# Patient Record
Sex: FEMALE | Race: OTHER | Employment: FULL TIME | URBAN - METROPOLITAN AREA
[De-identification: names, ages, dates, MRNs, and addresses within clinical notes are randomized per-mention and may not be internally consistent; named-entity substitution may affect disease eponyms.]

---

## 2022-07-27 ENCOUNTER — APPOINTMENT (OUTPATIENT)
Dept: CT IMAGING | Age: 27
End: 2022-07-27
Payer: COMMERCIAL

## 2022-07-27 ENCOUNTER — HOSPITAL ENCOUNTER (EMERGENCY)
Age: 27
Discharge: HOME | End: 2022-07-27
Payer: COMMERCIAL

## 2022-07-27 VITALS
TEMPERATURE: 98.2 F | RESPIRATION RATE: 20 BRPM | HEART RATE: 74 BPM | SYSTOLIC BLOOD PRESSURE: 143 MMHG | OXYGEN SATURATION: 100 % | DIASTOLIC BLOOD PRESSURE: 81 MMHG

## 2022-07-27 DIAGNOSIS — S09.90XA CLOSED HEAD INJURY, INITIAL ENCOUNTER: Primary | ICD-10-CM

## 2022-07-27 PROCEDURE — 70486 CT MAXILLOFACIAL W/O DYE: CPT

## 2022-07-27 PROCEDURE — 70450 CT HEAD/BRAIN W/O DYE: CPT

## 2022-07-27 PROCEDURE — 72125 CT NECK SPINE W/O DYE: CPT

## 2022-07-27 PROCEDURE — 99284 EMERGENCY DEPT VISIT MOD MDM: CPT

## 2022-07-27 PROCEDURE — 6370000000 HC RX 637 (ALT 250 FOR IP): Performed by: PHYSICIAN ASSISTANT

## 2022-07-27 RX ORDER — CYCLOBENZAPRINE HCL 10 MG
10 TABLET ORAL ONCE
Status: COMPLETED | OUTPATIENT
Start: 2022-07-27 | End: 2022-07-27

## 2022-07-27 RX ORDER — ACETAMINOPHEN 500 MG
1000 TABLET ORAL ONCE
Status: COMPLETED | OUTPATIENT
Start: 2022-07-27 | End: 2022-07-27

## 2022-07-27 RX ADMIN — CYCLOBENZAPRINE 10 MG: 10 TABLET, FILM COATED ORAL at 18:24

## 2022-07-27 RX ADMIN — ACETAMINOPHEN 1000 MG: 500 TABLET ORAL at 18:24

## 2022-07-27 ASSESSMENT — ENCOUNTER SYMPTOMS
NAUSEA: 0
VOMITING: 0
ABDOMINAL PAIN: 0
DIARRHEA: 0
SHORTNESS OF BREATH: 0

## 2022-07-27 ASSESSMENT — PAIN - FUNCTIONAL ASSESSMENT: PAIN_FUNCTIONAL_ASSESSMENT: 0-10

## 2022-07-27 ASSESSMENT — PAIN SCALES - GENERAL: PAINLEVEL_OUTOF10: 10

## 2022-07-27 NOTE — ED PROVIDER NOTES
[facility]        Pt Name: Pepe Simon  MRN: [id number]  [place] 1995  Date of evaluation: 7/27/2022  Provider: Whit Tompkins PA-C  PCP: No primary care provider on file. Note Started: 7:29 PM EDT       TAMMY. I have evaluated this patient. My supervising physician was available for consultation. CHIEF COMPLAINT       Chief Complaint   Patient presents with    Fall     Patient triaged using  #[id number], states was at work and a grid like object with wheels fell on her whole body, states head hurts the worst. Patient has knot noted to R side of forehead. HISTORY OF PRESENT ILLNESS   (Location, Timing/Onset, Context/Setting, Quality, Duration, Modifying Factors, Severity, Associated Signs and Symptoms)  Note limiting factors. The patient's preferred language is Korean, language lines are used for interpretation,  #[id number]    Chief Complaint: Closed head injury    Pepe Simon is a 32 y.o. female who presents to the emergency department today for evaluation for a closed head injury which occurred shortly before arriving to the ED. The patient states that she was at work, and she states that a wire net fell and hit her on the right side of her head. There was no loss of consciousness. No vomiting. She states that this did cause her to fall on her back. Patient is not on any blood thinners. When she arrived to the ED, she is complaining of pain to the right side of her head from where the area hit, she believes that it weighs less than 40 pounds. Patient is also reporting a headache. She is rating all of her discomfort as a 10/10, she otherwise denies any known alleviating or aggravating factors. Patient has no visual changes. She has no numbness, tingling or weakness. She is reporting mild pain to the right side of her neck but otherwise denies any back pain. She has no radiculopathy. She has no numbness, tingling or weakness. No chest pain. No shortness of breath. No abdominal pain. She is able to ambulate that difficulty. No other complaints or symptoms    Nursing Notes were all reviewed and agreed with or any disagreements were addressed in the HPI. REVIEW OF SYSTEMS    (2-9 systems for level 4, 10 or more for level 5)     Review of Systems   Constitutional:  Negative for activity change, appetite change, chills and fever. Eyes:  Negative for visual disturbance. Respiratory:  Negative for shortness of breath. Cardiovascular:  Negative for chest pain. Gastrointestinal:  Negative for abdominal pain, diarrhea, nausea and vomiting. Genitourinary:  Negative for difficulty urinating, dysuria and hematuria. Neurological:  Positive for headaches. Negative for weakness and numbness. Positives and Pertinent negatives as per HPI. Except as noted above in the ROS, all other systems were reviewed and negative. PAST MEDICAL HISTORY   History reviewed. No pertinent past medical history. SURGICAL HISTORY   History reviewed. No pertinent surgical history. CURRENTMEDICATIONS       Previous Medications    No medications on file         ALLERGIES     Patient has no known allergies. FAMILYHISTORY     History reviewed. No pertinent family history.        SOCIAL HISTORY       Social History     Tobacco Use    Smoking status: Never    Smokeless tobacco: Never   Substance Use Topics    Alcohol use: Never    Drug use: Never       SCREENINGS    John Coma Scale  Eye Opening: Spontaneous  Best Verbal Response: Oriented  Best Motor Response: Obeys commands  John Coma Scale Score: 15        PHYSICAL EXAM    (up to 7 for level 4, 8 or more for level 5)     ED Triage Vitals [07/27/22 1810]   BP Temp Temp Source Heart Rate Resp SpO2 Height Weight   (!) 143/81 98.2 °F (36.8 °C) Oral 74 20 100 % -- --       Physical Exam  Vitals and nursing note reviewed. Constitutional:       Appearance: She is well-developed. She is not diaphoretic. HENT:      Head: Normocephalic and atraumatic. Comments: There is tenderness, and mild edema noted over the right frontal forehead, with an abrasion noted. No bogginess. No bony step-offs. There is tenderness noted over this area. No other facial bone tenderness. No sanderson sign or raccoon sign. No CSF rhinorrhea. Right Ear: External ear normal.      Left Ear: External ear normal.      Nose: Nose normal.      Mouth/Throat:      Mouth: Mucous membranes are moist.      Pharynx: Oropharynx is clear. No posterior oropharyngeal erythema. Eyes:      General:         Right eye: No discharge. Left eye: No discharge. Extraocular Movements: Extraocular movements intact. Conjunctiva/sclera: Conjunctivae normal.      Pupils: Pupils are equal, round, and reactive to light. Neck:      Trachea: No tracheal deviation. Cardiovascular:      Rate and Rhythm: Normal rate and regular rhythm. Pulses: Normal pulses. Heart sounds: No murmur heard. Pulmonary:      Effort: Pulmonary effort is normal. No respiratory distress. Breath sounds: Normal breath sounds. No wheezing or rales. Comments: No ecchymosis or obvious signs of trauma  Chest:      Chest wall: No tenderness. Abdominal:      General: Bowel sounds are normal. There is no distension. Palpations: Abdomen is soft. Tenderness: There is no abdominal tenderness. There is no guarding. Comments: No ecchymosis or obvious signs of trauma   Musculoskeletal:         General: Normal range of motion. Cervical back: Normal range of motion and neck supple. Comments: There is tenderness palpation to the paraspinous muscles of the right cervical spine, there is no midline tenderness. No other tenderness noted to the back, ecchymosis or obvious signs of trauma. Skin:     General: Skin is warm and dry.    Neurological: Exception - unselect if not a suspected or confirmed emergency medical condition->Emergency Medical Condition (MA) Is the patient pregnant?->No Reason for Exam: Fall (Patient triaged using  #[id number], states was at work and a grid like object with wheels fell on her whole body, states head hurts the worst. Patient has knot noted to R side of forehead. ). FINDINGS: BRAIN/VENTRICLES: There is no acute intracranial hemorrhage, mass effect or midline shift. No abnormal extra-axial fluid collection. The gray-white differentiation is maintained without evidence of an acute infarct. There is no evidence of hydrocephalus. ORBITS: The visualized portion of the orbits demonstrate no acute abnormality. SINUSES: The visualized paranasal sinuses and mastoid air cells demonstrate no acute abnormality. SOFT TISSUES/SKULL:  Hematoma overlying right frontal bone. No acute intracranial abnormality. CT FACIAL BONES WO CONTRAST    Result Date: 7/27/2022  EXAMINATION: CT OF THE FACE WITHOUT CONTRAST  7/27/2022 6:37 pm TECHNIQUE: CT of the face was performed without the administration of intravenous contrast. Multiplanar reformatted images are provided for review. Automated exposure control, iterative reconstruction, and/or weight based adjustment of the mA/kV was utilized to reduce the radiation dose to as low as reasonably achievable. COMPARISON: None HISTORY: ORDERING SYSTEM PROVIDED HISTORY: injury TECHNOLOGIST PROVIDED HISTORY: Reason for exam:->injury Decision Support Exception - unselect if not a suspected or confirmed emergency medical condition->Emergency Medical Condition (MA) Is the patient pregnant?->No Reason for Exam: Fall (Patient triaged using  #[id number], states was at work and a grid like object with wheels fell on her whole body, states head hurts the worst. Patient has knot noted to R side of forehead. ).  FINDINGS: FACIAL BONES: The frontal sinuses, orbital walls, maxilla, pterygoid plates, 143/81   Pulse: 74   Resp: 20   Temp: 98.2 °F (36.8 °C)   TempSrc: Oral   SpO2: 100%       Patient was given the following medications:  Medications   acetaminophen (TYLENOL) tablet 1,000 mg (1,000 mg Oral Given 7/27/22 1824)   cyclobenzaprine (FLEXERIL) tablet 10 mg (10 mg Oral Given 7/27/22 1824)         Is this patient to be included in the SEP-1 Core Measure due to severe sepsis or septic shock? No   Exclusion criteria - the patient is NOT to be included for SEP-1 Core Measure due to: Infection is not suspected    Briefly, this is a 75-year-old female who presents to the emergency department today for evaluation for a closed head injury which occurred today before arrival to the ED. The patient states that she was at work, and she states that a wire netting fell, and hit her in the head. No loss of consciousness. There is no vomiting. On examination she does have tenderness, and edema as well as abrasion noted to the right forehead, there are no neurological deficits. She does have tenderness noted to the paraspinous muscles of the right cervical spine. CT of head, cervical spine and facial bones are negative. Symptoms today are likely due to a closed head injury, and contusion. Supportive care discussed at home. She is to obtain follow-up with her primary care physician within 2 to 3 days for reevaluation. She is to return to the ED for any new or worsening symptoms. Patient voiced understanding and is agreeable with plan. She is stable for discharge. No results found for this visit on 07/27/22. I estimate there is LOW risk for COMPARTMENT SYNDROME, DEEP VENOUS THROMBOSIS, SEPTIC ARTHRITIS, TENDON OR NEUROVASCULAR INJURY, thus I consider the discharge disposition reasonable.  Katerina Dodge and I have discussed the diagnosis and risks, and we agree with discharging home to follow-up with their primary doctor or the referral orthopedist. We also discussed returning to the Emergency Department immediately if new or worsening symptoms occur. We have discussed the symptoms which are most concerning (e.g., changing or worsening pain, numbness, weakness) that necessitate immediate return. FINAL IMPRESSION      1.  Closed head injury, initial encounter          DISPOSITION/PLAN   DISPOSITION Discharge - Pending Orders Complete 07/27/2022 07:33:57 PM      PATIENT REFERRED TO:  [facility]) Pre-Services  [phone]  Schedule an appointment as soon as possible for a visit in 2 days      [facility] Emergency Department  [address] [place] [address]  [phone]    As needed, If symptoms worsen    DISCHARGE MEDICATIONS:  New Prescriptions    No medications on file       DISCONTINUED MEDICATIONS:  Discontinued Medications    No medications on file              (Please note that portions of this note were completed with a voice recognition program.  Efforts were made to edit the dictations but occasionally words are mis-transcribed.)    Ezekiel Hernández PA-C (electronically signed)           Ezekiel Hernández PA-C  07/27/22 1934

## 2022-08-01 ENCOUNTER — APPOINTMENT (OUTPATIENT)
Dept: CT IMAGING | Age: 27
End: 2022-08-01
Payer: COMMERCIAL

## 2022-08-01 ENCOUNTER — NURSE TRIAGE (OUTPATIENT)
Dept: OTHER | Facility: CLINIC | Age: 27
End: 2022-08-01

## 2022-08-01 ENCOUNTER — HOSPITAL ENCOUNTER (EMERGENCY)
Age: 27
Discharge: HOME | End: 2022-08-01
Attending: EMERGENCY MEDICINE
Payer: COMMERCIAL

## 2022-08-01 VITALS
DIASTOLIC BLOOD PRESSURE: 77 MMHG | TEMPERATURE: 98.1 F | SYSTOLIC BLOOD PRESSURE: 120 MMHG | RESPIRATION RATE: 16 BRPM | HEIGHT: 62 IN | OXYGEN SATURATION: 100 % | WEIGHT: 121 LBS | BODY MASS INDEX: 22.26 KG/M2 | HEART RATE: 79 BPM
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
## 2022-08-01 DIAGNOSIS — S09.90XA CLOSED HEAD INJURY, INITIAL ENCOUNTER: ICD-10-CM

## 2022-08-01 PROCEDURE — 99284 EMERGENCY DEPT VISIT MOD MDM: CPT

## 2022-08-01 PROCEDURE — 70450 CT HEAD/BRAIN W/O DYE: CPT

## 2022-08-01 PROCEDURE — 72125 CT NECK SPINE W/O DYE: CPT

## 2022-08-01 ASSESSMENT — ENCOUNTER SYMPTOMS
DIARRHEA: 0
VOMITING: 0
SHORTNESS OF BREATH: 0
NAUSEA: 0
CHEST TIGHTNESS: 0
ABDOMINAL PAIN: 0
EYE REDNESS: 1

## 2022-08-01 ASSESSMENT — PAIN - FUNCTIONAL ASSESSMENT
PAIN_FUNCTIONAL_ASSESSMENT: 0-10
PAIN_FUNCTIONAL_ASSESSMENT: 0-10

## 2022-08-01 ASSESSMENT — PAIN SCALES - GENERAL: PAINLEVEL_OUTOF10: 8

## 2022-08-01 ASSESSMENT — VISUAL ACUITY: OU: 1

## 2022-08-01 NOTE — TELEPHONE ENCOUNTER
Received call from [facility] at [facility]; Patient with Red Flag Complaint requesting to establish care with unknown. Subjective: Caller states \"hit head at work on Thursday, has black eye, head pain, eye pain, forehead swelling and bruising\"     Current Symptoms: hit head at work on Thursday, has black eye, head pain, eye pain, forehead swelling and bruising    Onset: 4 days ago; unchanged    Associated Symptoms:  none    Pain Severity: 8/10; aching; waxing and waning    Temperature: denies    What has been tried: tylenol helps    LMP: NA Pregnant: No    Recommended disposition: Go to ED Now for further evaluation of head injury. Care advice provided, patient verbalizes understanding; denies any other questions or concerns; instructed to call back for any new or worsening symptoms. Patient/caller agrees to proceed to nearest Emergency Department    Attention Provider: Thank you for allowing me to participate in the care of your patient. The patient was connected to triage in response to information provided to the ECC. Please do not respond through this encounter as the response is not directed to a shared pool.         Reason for Disposition   Can't remember what happened (amnesia)    Protocols used: Head Injury-ADULT-[place]

## 2022-08-02 NOTE — ED PROVIDER NOTES
[facility]    Physician Attestation    Pt Name: Edie Prajapati  MRN: [id number]  [place] 1995  Date of evaluation: 8/1/22        Physician Note:    I havepersonally performed and/or participated in the history, exam and medical decision making and agree with all pertinent clinical information. I have also reviewed and agree with the past medical, family and social historyunless otherwise noted. I have personally performed a face to face diagnostic evaluation onthis patient. I have reviewed the mid-levels findings and agree. History: States she was hit the head by a metal mat at work on Thursday was seen here with a negative scan of the head face and neck still has headache in the right frontal region      REVIEW OF SYSTEMS    Constitutional:  Denies fever, chills, or weakness   Eyes:  Denies vision changes  HENT:  Denies sore throat or neck pain   Respiratory:  Denies cough or shortness of breath   Cardiovascular:  Denies chest pain  GI:  Denies abdominal pain, nausea, vomiting, or diarrhea   Musculoskeletal:  Denies back pain   Skin: no rash or vesicles   Neurologic:   headache   no weakness focal    Lymphatic:  no swollen  nodes   Psychiatric: no si or hs thoughts     All systems negative except as marked. General Appearance:  Alert, cooperative, no distress, appears stated age. Head:  Normocephalic, without obvious abnormality, Luberta Glance of the right frontal region tender   Eyes:  conjunctiva/corneas clear, EOM's intact. Sclera anicteric. ENT: Mucous membranes moist.   Neck: Supple, symmetrical, trachea midline, no adenopathy. No jugular venous distention. Lungs:   No Respiratory Distress. no rales  rhonchi rub   Chest Wall:  Nontender  no deformity   Heart:  Rsr no murmer gallop    Abdomen:   Soft nontender no organomegally    Extremities:  Full range of motion. no deformity   Pulses: Equal  upper and lower    Skin:  No rashes or lesions to exposed skin. Neurologic: Alert and oriented X 3. Motor grossly normal.  Speech clear. Cr n 2-12 intact     CT of the head and neck unremarkable patient will follow up with [facility]    1. Work related injury    2. Closed head injury, initial encounter          DISPOSITION/PLAN  PATIENT REFERRED TO:  [place] [address] [address] [facility]  [phone]    Schedule an appointment as soon as possible for a visit     DISCHARGE MEDICATIONS:  There are no discharge medications for this patient. Is this patient to be included in the SEP-1 Core Measure due to severe sepsis or septic shock? No   Exclusion criteria - the patient is NOT to be included for SEP-1 Core Measure due to:   Infection is not suspected      MD Dharmesh Comer MD  08/01/22 9938

## 2022-08-02 NOTE — ED PROVIDER NOTES
[address] [facility]        Pt Name: Edie Prajapati  MRN: [id number]  [place] 1995  Date of evaluation: 8/1/2022  Provider: CARL Waldron CNP  PCP: No primary care provider on file. Note Started: 9:42 PM EDT        I have seen and evaluated this patient with my supervising physician [address] [place] [address]       Chief Complaint   Patient presents with    Head Injury     Pt states that she was working on Thursday and had an accident at work. Pt states that she hit her head on a big piece of metal on the cart. pt was going to follow-up apt with a doctor but she started having neck pain, really bad head pain and the bright lights started hurting her eyes and loud noises cause a headache since saturday so they told her to come here. Has been taking tylenol but hasn't helped. 8/10 pain        HISTORY OF PRESENT ILLNESS   (Location, Timing/Onset, Context/Setting, Quality, Duration, Modifying Factors, Severity, Associated Signs and Symptoms)  Note limiting factors. Chief Complaint: re-evaluation for injury     Edie Prajapati is a 32 y.o. female who presents to the emergency department for reevaluation of injury. The patient reports that on 7/27/2022 that while working a gridlike object with wheels fell on her whole body, injuring her head/face and neck. Reports that she was seen that day and had a CT scan which was benign. Returns today requesting reevaluation, states that she continues to have a headache and redness to the right eye. No visual disturbances. Also complaining of right-sided neck pain. The patient has no focal weakness, numbness or tingling. Denies any fever, lightheadedness, dizziness, visual disturbances. No chest pain or pressure. No back pain. No shortness of breath, cough, or congestion. No abdominal pain, nausea, vomiting, diarrhea, constipation, or dysuria.   No rash.    Nursing Notes were all reviewed and agreed with or any disagreements were addressed in the HPI. REVIEW OF SYSTEMS    (2-9 systems for level 4, 10 or more for level 5)     Review of Systems   Constitutional:  Negative for activity change, chills and fever. Eyes:  Positive for redness. Respiratory:  Negative for chest tightness and shortness of breath. Cardiovascular:  Negative for chest pain. Gastrointestinal:  Negative for abdominal pain, diarrhea, nausea and vomiting. Genitourinary:  Negative for dysuria. Musculoskeletal:  Positive for neck pain. Neurological:  Positive for headaches. All other systems reviewed and are negative. Positives and Pertinent negatives as per HPI. Except as noted above in the ROS, all other systems were reviewed and negative. PAST MEDICAL HISTORY   History reviewed. No pertinent past medical history. SURGICAL HISTORY   History reviewed. No pertinent surgical history. CURRENTMEDICATIONS       There are no discharge medications for this patient. ALLERGIES     Patient has no known allergies. FAMILYHISTORY     History reviewed. No pertinent family history. SOCIAL HISTORY       Social History     Tobacco Use    Smoking status: Never    Smokeless tobacco: Never   Substance Use Topics    Alcohol use: Never    Drug use: Never       SCREENINGS    John Coma Scale  Eye Opening: Spontaneous  Best Verbal Response: Oriented  Best Motor Response: Obeys commands  John Coma Scale Score: 15        PHYSICAL EXAM    (up to 7 for level 4, 8 or more for level 5)     ED Triage Vitals [08/01/22 1957]   BP Temp Temp Source Heart Rate Resp SpO2 Height Weight   120/77 98.1 °F (36.7 °C) Oral 79 16 100 % 5' 2\" (1.575 m) 121 lb (54.9 kg)       Physical Exam  Vitals and nursing note reviewed. Constitutional:       Appearance: She is well-developed. She is not diaphoretic. HENT:      Head: Normocephalic.         Right Ear: External ear normal. Left Ear: External ear normal.   Eyes:      General: Vision grossly intact. Right eye: No discharge. Left eye: No discharge. Extraocular Movements: Extraocular movements intact. Conjunctiva/sclera:      Right eye: Hemorrhage present. Neck:      Vascular: No JVD. Cardiovascular:      Rate and Rhythm: Normal rate and regular rhythm. Pulses: Normal pulses. Heart sounds: Normal heart sounds. Pulmonary:      Effort: Pulmonary effort is normal. No respiratory distress. Breath sounds: Normal breath sounds. Abdominal:      Palpations: Abdomen is soft. Musculoskeletal:         General: Normal range of motion. Skin:     General: Skin is warm and dry. Coloration: Skin is not pale. Neurological:      Mental Status: She is alert. Cranial Nerves: Cranial nerves are intact. Sensory: Sensation is intact. Motor: Motor function is intact. Coordination: Coordination is intact. Psychiatric:         Behavior: Behavior normal.       DIAGNOSTIC RESULTS   LABS:    Labs Reviewed - No data to display    When ordered only abnormal lab results are displayed. All other labs were within normal range or not returned as of this dictation. EKG: When ordered, EKG's are interpreted by the Emergency Department Physician in the absence of a cardiologist.  Please see their note for interpretation of EKG. RADIOLOGY:   Non-plain film images such as CT, Ultrasound and MRI are read by the radiologist. Plain radiographic images are visualized and preliminarily interpreted by the ED Provider with the below findings:        Interpretation per the Radiologist below, if available at the time of this note:    CT CERVICAL SPINE WO CONTRAST   Final Result   No acute abnormality of the cervical spine.          CT HEAD WO CONTRAST   Preliminary Result   Redemonstration of soft tissue hematoma overlying the right frontal bone,   which appears to have slightly decreased when compared to prior study. No   convincing evidence of intracranial hemorrhage, mass effect or acute   territorial infarct seen. RECOMMENDATIONS:   If there is persistent clinical concern, short-term follow-up examination or   MRI can be consider. CT HEAD WO CONTRAST    Result Date: 8/1/2022  EXAMINATION: CT OF THE HEAD WITHOUT CONTRAST  8/1/2022 8:31 pm TECHNIQUE: CT of the head was performed without the administration of intravenous contrast. Automated exposure control, iterative reconstruction, and/or weight based adjustment of the mA/kV was utilized to reduce the radiation dose to as low as reasonably achievable. COMPARISON: 07/27/2022. HISTORY: ORDERING SYSTEM PROVIDED HISTORY: head injury TECHNOLOGIST PROVIDED HISTORY: If patient is on cardiac monitor and/or pulse ox, they may be taken off cardiac monitor and pulse ox, left on O2 if currently on. All monitors reattached when patient returns to room. Has a \"code stroke\" or \"stroke alert\" been called? ->No Reason for exam:->head injury Decision Support Exception - unselect if not a suspected or confirmed emergency medical condition->Emergency Medical Condition (MA) Is the patient pregnant?->No Reason for Exam: Head Injury (Pt states that she was working on Thursday and had an accident at work. Pt states that she hit her head on a big piece of metal on the cart. pt was going to follow-up apt with a doctor but she started having neck pain, really bad head pain and the bright lights started hurting her eyes and loud noises cause a headache since saturday so they told her to come here. Has been taking tylenol but hasn't helped. 8/10 pain ). FINDINGS: BRAIN/VENTRICLES: No convincing evidence of acute intracranial hemorrhage, mass effect or midline shift seen. No abnormal extra-axial fluid collection seen. No evidence of acute territorial infarct is seen. ORBITS: The visualized portion of the orbits demonstrate no acute abnormality.  SINUSES: The visualized paranasal sinuses and mastoid air cells demonstrate no acute abnormality. SOFT TISSUES/SKULL:  There is redemonstration of soft tissue hematoma overlying the right frontal bone, which appears to have slightly decreased when compared to prior study. Redemonstration of soft tissue hematoma overlying the right frontal bone, which appears to have slightly decreased when compared to prior study. No convincing evidence of intracranial hemorrhage, mass effect or acute territorial infarct seen. RECOMMENDATIONS: If there is persistent clinical concern, short-term follow-up examination or MRI can be consider. CT HEAD WO CONTRAST    Result Date: 7/27/2022  EXAMINATION: CT OF THE HEAD WITHOUT CONTRAST  7/27/2022 6:37 pm TECHNIQUE: CT of the head was performed without the administration of intravenous contrast. Automated exposure control, iterative reconstruction, and/or weight based adjustment of the mA/kV was utilized to reduce the radiation dose to as low as reasonably achievable. COMPARISON: None. HISTORY: ORDERING SYSTEM PROVIDED HISTORY: fall TECHNOLOGIST PROVIDED HISTORY: Reason for exam:->fall Has a \"code stroke\" or \"stroke alert\" been called? ->No Decision Support Exception - unselect if not a suspected or confirmed emergency medical condition->Emergency Medical Condition (MA) Is the patient pregnant?->No Reason for Exam: Fall (Patient triaged using  #[id number], states was at work and a grid like object with wheels fell on her whole body, states head hurts the worst. Patient has knot noted to R side of forehead. ). FINDINGS: BRAIN/VENTRICLES: There is no acute intracranial hemorrhage, mass effect or midline shift. No abnormal extra-axial fluid collection. The gray-white differentiation is maintained without evidence of an acute infarct. There is no evidence of hydrocephalus. ORBITS: The visualized portion of the orbits demonstrate no acute abnormality.  SINUSES: The visualized paranasal sinuses and mastoid air cells demonstrate no acute abnormality. SOFT TISSUES/SKULL:  Hematoma overlying right frontal bone. No acute intracranial abnormality. CT FACIAL BONES WO CONTRAST    Result Date: 7/27/2022  EXAMINATION: CT OF THE FACE WITHOUT CONTRAST  7/27/2022 6:37 pm TECHNIQUE: CT of the face was performed without the administration of intravenous contrast. Multiplanar reformatted images are provided for review. Automated exposure control, iterative reconstruction, and/or weight based adjustment of the mA/kV was utilized to reduce the radiation dose to as low as reasonably achievable. COMPARISON: None HISTORY: ORDERING SYSTEM PROVIDED HISTORY: injury TECHNOLOGIST PROVIDED HISTORY: Reason for exam:->injury Decision Support Exception - unselect if not a suspected or confirmed emergency medical condition->Emergency Medical Condition (MA) Is the patient pregnant?->No Reason for Exam: Fall (Patient triaged using  #[id number], states was at work and a grid like object with wheels fell on her whole body, states head hurts the worst. Patient has knot noted to R side of forehead. ). FINDINGS: FACIAL BONES: The frontal sinuses, orbital walls, maxilla, pterygoid plates, zygomatic arches, hard palate, nasal bones and mandible are intact. The temporomandibular joints are aligned. ORBITAL CONTENTS: The globes appear intact. The extraocular muscles, optic nerve sheath complexes and lacrimal glands appear unremarkable. No retrobulbar hematoma or mass is seen. SINUSES: There is no evidence of acute sinusitis, such as air fluid level. The mastoid air cells are clear. SOFT TISSUES: No superficial facial soft tissue swelling is seen. No acute facial bone trauma.      CT CERVICAL SPINE WO CONTRAST    Result Date: 7/27/2022  EXAMINATION: CT OF THE CERVICAL SPINE WITHOUT CONTRAST 7/27/2022 6:37 pm TECHNIQUE: CT of the cervical spine was performed without the administration of intravenous contrast. Multiplanar reformatted images are provided for review. Automated exposure control, iterative reconstruction, and/or weight based adjustment of the mA/kV was utilized to reduce the radiation dose to as low as reasonably achievable. COMPARISON: None. HISTORY: ORDERING SYSTEM PROVIDED HISTORY: fall TECHNOLOGIST PROVIDED HISTORY: Reason for exam:->fall Decision Support Exception - unselect if not a suspected or confirmed emergency medical condition->Emergency Medical Condition (MA) Is the patient pregnant?->No Reason for Exam: Fall (Patient triaged using  #[id number], states was at work and a grid like object with wheels fell on her whole body, states head hurts the worst. Patient has knot noted to R side of forehead. ). FINDINGS: BONES/ALIGNMENT: There is no acute fracture or traumatic malalignment. DEGENERATIVE CHANGES: No significant degenerative changes. SOFT TISSUES: There is no prevertebral soft tissue swelling. No acute abnormality of the cervical spine. PROCEDURES   Unless otherwise noted below, none     Procedures    CRITICAL CARE TIME       CONSULTS:  None      EMERGENCY DEPARTMENT COURSE and DIFFERENTIAL DIAGNOSIS/MDM:   Vitals:    Vitals:    08/01/22 1957   BP: 120/77   Pulse: 79   Resp: 16   Temp: 98.1 °F (36.7 °C)   TempSrc: Oral   SpO2: 100%   Weight: 121 lb (54.9 kg)   Height: 5' 2\" (1.575 m)       Patient was given the following medications:  Medications - No data to display      Is this patient to be included in the SEP-1 Core Measure due to severe sepsis or septic shock? No   Exclusion criteria - the patient is NOT to be included for SEP-1 Core Measure due to: Infection is not suspected    Briefly, this is an otherwise healthy 66-year-old female who presents to the emergency department for evaluation from workplace injury that occurred on 7/27/2022. The patient reports that she continues to have headaches and she is concerned about the redness in the right eye.     She has no vomiting, on examination she does have tenderness, edema as well as abrasion noted to the right side of her forehead. No neurological deficits. Also has tenderness to the paraspinous musculature on the right cervical spine. No midline tenderness. She will be referred to [address] [place] [address] to follow-up with occupational medicine for this work-related injury. Instructed to try ibuprofen/Tylenol as needed for symptoms. Repeat imaging is unremarkable. I estimate there is LOW risk for SKULL FRACTURE, INTRACRANIAL HEMORRHAGE, CERVICAL SPINE INJURY, SUBDURAL OR EPIDURAL HEMATOMA,  thus I consider the discharge disposition reasonable. FINAL IMPRESSION      1. Work related injury    2. Closed head injury, initial encounter          DISPOSITION/PLAN   DISPOSITION Decision To Discharge 08/01/2022 10:13:30 PM      PATIENT REFERRED TO:  *[address]   [address]    Schedule an appointment as soon as possible for a visit       DISCHARGE MEDICATIONS:  There are no discharge medications for this patient. DISCONTINUED MEDICATIONS:  There are no discharge medications for this patient.              (Please note that portions of this note were completed with a voice recognition program.  Efforts were made to edit the dictations but occasionally words are mis-transcribed.)    CARL Wray CNP (electronically signed)            CARL Wray CNP  08/02/22 2719

## 2022-08-02 NOTE — ED NOTES
Pt Discharged in stable condition, VSS, no signs of distress, discharge instructions and meds reviewed. Pt verbalizes understanding and states no further questions or concerns unaddressed.        Geovani Kovacs RN  08/01/22 3505